# Patient Record
Sex: FEMALE | Race: WHITE | NOT HISPANIC OR LATINO | URBAN - METROPOLITAN AREA
[De-identification: names, ages, dates, MRNs, and addresses within clinical notes are randomized per-mention and may not be internally consistent; named-entity substitution may affect disease eponyms.]

---

## 2021-10-31 ENCOUNTER — EMERGENCY (EMERGENCY)
Facility: HOSPITAL | Age: 21
LOS: 1 days | Discharge: HOME | End: 2021-10-31
Attending: EMERGENCY MEDICINE | Admitting: EMERGENCY MEDICINE
Payer: COMMERCIAL

## 2021-10-31 VITALS
RESPIRATION RATE: 18 BRPM | TEMPERATURE: 98 F | HEART RATE: 84 BPM | HEIGHT: 67 IN | OXYGEN SATURATION: 99 % | WEIGHT: 154.98 LBS | SYSTOLIC BLOOD PRESSURE: 108 MMHG | DIASTOLIC BLOOD PRESSURE: 77 MMHG

## 2021-10-31 DIAGNOSIS — Z88.2 ALLERGY STATUS TO SULFONAMIDES: ICD-10-CM

## 2021-10-31 DIAGNOSIS — Z20.822 CONTACT WITH AND (SUSPECTED) EXPOSURE TO COVID-19: ICD-10-CM

## 2021-10-31 DIAGNOSIS — J06.9 ACUTE UPPER RESPIRATORY INFECTION, UNSPECIFIED: ICD-10-CM

## 2021-10-31 DIAGNOSIS — Z88.1 ALLERGY STATUS TO OTHER ANTIBIOTIC AGENTS STATUS: ICD-10-CM

## 2021-10-31 DIAGNOSIS — R50.9 FEVER, UNSPECIFIED: ICD-10-CM

## 2021-10-31 DIAGNOSIS — F17.210 NICOTINE DEPENDENCE, CIGARETTES, UNCOMPLICATED: ICD-10-CM

## 2021-10-31 LAB
FLUAV H1 2009 PAND RNA SPEC QL NAA+PROBE: SIGNIFICANT CHANGE UP
FLUAV H1 RNA SPEC QL NAA+PROBE: SIGNIFICANT CHANGE UP
FLUAV H3 RNA SPEC QL NAA+PROBE: SIGNIFICANT CHANGE UP
FLUAV SUBTYP SPEC NAA+PROBE: SIGNIFICANT CHANGE UP
FLUBV RNA SPEC QL NAA+PROBE: SIGNIFICANT CHANGE UP
RAPID RVP RESULT: SIGNIFICANT CHANGE UP
SARS-COV-2 RNA SPEC QL NAA+PROBE: SIGNIFICANT CHANGE UP

## 2021-10-31 PROCEDURE — 71046 X-RAY EXAM CHEST 2 VIEWS: CPT | Mod: 26

## 2021-10-31 PROCEDURE — 99284 EMERGENCY DEPT VISIT MOD MDM: CPT | Mod: 25

## 2021-10-31 NOTE — ED PROVIDER NOTE - NSFOLLOWUPINSTRUCTIONS_ED_ALL_ED_FT
No work for three days.    Your covid test will be tested to you when it is ready.     Please return at any time if you have any concerns or get worse or do not improve.    Edith GILBERT our  may reach out to you to help set up a primary care appointment.

## 2021-10-31 NOTE — ED PROVIDER NOTE - OBJECTIVE STATEMENT
20y/o F presents to ED with cold symptoms since Thursday. 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 Takes Zoloft, valtrex, and birth control. Pt endorses fever of 99.7F and has a raspy voice. Pt is a smoker and denies chest pain or throat pain. No other complaints.

## 2021-10-31 NOTE — ED ADULT NURSE NOTE - OBJECTIVE STATEMENT
Pt is a 21y female complaining of cough, chest congestion and low grade fever. Pt sent in from urgent care for further eval.

## 2021-10-31 NOTE — ED PROVIDER NOTE - PATIENT PORTAL LINK FT
You can access the FollowMyHealth Patient Portal offered by [facility] by registering at the following website: [email or web address]. By joining [employer]’s FollowMyHealth portal, you will also be able to view your health information using other applications (apps) compatible with our system.

## 2021-10-31 NOTE — ED ADULT TRIAGE NOTE - CHIEF COMPLAINT QUOTE
Patient to ED with complaint of cough, chest congestion and cold symptoms . Sent from [facility] for eval

## 2021-10-31 NOTE — ED PROVIDER NOTE - CLINICAL SUMMARY MEDICAL DECISION MAKING FREE TEXT BOX
22 y/o F presents to ED cough and slight fever. Previous COVID test came back negative. Will obtain viral panel including COVID test and chest X-ray. 20 y/o F presents to ED cough and slight fever. Previous COVID test came back negative. Will obtain viral panel including COVID test and chest X-ray.  CXR neg, f/u with primary care.

## 2021-10-31 NOTE — ED PROVIDER NOTE - CARE PROVIDERS DIRECT ADDRESSES
[email or web address]

## 2021-10-31 NOTE — ED PROVIDER NOTE - CARE PROVIDER_API CALL
Janette Roman ([place])  [place] [address]  Phone: [phone]  Fax: [phone]  Follow Up Time:     Aurora Galindo; MPH)  Internal Medicine  [address] [place] [address]  Phone: [phone]  Fax: [phone]  Follow Up Time:

## 2024-10-10 NOTE — ED PROVIDER NOTE - RESPIRATORY, MLM
[phone] or [phone].    Breath sounds clear and equal bilaterally. - - -